# Patient Record
(demographics unavailable — no encounter records)

---

## 2024-11-04 NOTE — HISTORY OF PRESENT ILLNESS
[FreeTextEntry1] : The patient complains of intermittent left-sided chest pain. The pain is described as aching, squeezing, and is radiating to left shoulder. The pain is both exertional and non- exertional and is aggravated by Stress and is relieved by rest. The pain is associated with SOB, and heart palpitations. Denies Dizziness, Leg Edema, Orthopnea, PND, Syncope, PENA, Diaphoresis Chest pain/SOB/Palpitations - Echo ordered to assess LV function/ possible valvular heart disease

## 2024-11-04 NOTE — REASON FOR VISIT
[Symptom and Test Evaluation] : symptom and test evaluation [Other: ____] : [unfilled] [Hypertension] : hypertension

## 2024-11-04 NOTE — PHYSICAL EXAM
[Well Developed] : well developed [Well Nourished] : well nourished [No Acute Distress] : no acute distress [Normal Conjunctiva] : normal conjunctiva [Normal Venous Pressure] : normal venous pressure [Normal S1, S2] : normal S1, S2 [No Rub] : no rub [No Gallop] : no gallop [Murmur] : murmur [Clear Lung Fields] : clear lung fields [Good Air Entry] : good air entry [No Respiratory Distress] : no respiratory distress  [Soft] : abdomen soft [Non Tender] : non-tender [No Masses/organomegaly] : no masses/organomegaly [Normal Bowel Sounds] : normal bowel sounds [Normal Gait] : normal gait [No Edema] : no edema [No Cyanosis] : no cyanosis [No Clubbing] : no clubbing [No Varicosities] : no varicosities [No Rash] : no rash [No Skin Lesions] : no skin lesions [Moves all extremities] : moves all extremities [No Focal Deficits] : no focal deficits [Normal Speech] : normal speech [Alert and Oriented] : alert and oriented [Normal memory] : normal memory [de-identified] :  2/6 VANIA --> Axilla

## 2024-11-04 NOTE — END OF VISIT
[FreeTextEntry3] :  All medical records entered made by the scribe were at my Dr. Shin Webster, discretion and personally dictated by me on Nov 4 2024 10:20AM. I have reviewed the chart and agree that the record accuracy reflects my personal performance of the history, physical exam, assessment and plan. I have also personally directed, reviewed and agreed with the chart. [Time Spent: ___ minutes] : I have spent [unfilled] minutes of time on the encounter which excludes teaching and separately reported services.

## 2024-11-04 NOTE — DISCUSSION/SUMMARY
[Hypertension] : hypertension [Medication Changes Per Orders] : Medication changes are as documented in orders [Continue] : continuing angiotensin receptor blockers [Begin] : beginning beta blockers [Smoking Cessation] : smoking cessation [ETOH Moderation] : alcohol moderation [Low Sodium Diet] : low sodium diet [NSAIDs Avoidance] : non-steroidal anti-inflammatory drugs avoidance [Patient] : the patient [FreeTextEntry1] : Chest pain/SOB/Palp - echo with normal LV fxn; ETT ordered to r/o underlying ischemia. Blood work drawn. Maintain a low caloric, low sodium, low cholesterol diet. Dietary counseling given, diet and exercise discussed in detail.

## 2025-02-28 NOTE — HISTORY OF PRESENT ILLNESS
[de-identified] :  44yo female presents complaining of right shoulder pain for about 2 months.  She sustained a mechanical fall in her home injuring her shoulder.  She states she has a history of vertigo she fell backwards.  She since then she has had severe pain worsening.  She saw her PCP who did a cortisone injection about 1 month ago with no relief.  She also took an oral course of steroids which helps temporarily.  She is difficulty lifting her arm overhead.  Severe pain with behind her back.  Denies numbness tingling  The patient's past medical history, past surgical history, medications, allergies, and social history were reviewed by me today with the patient and documented accordingly. In addition, the patient's family history, which is noncontributory to this visit, was also reviewed.

## 2025-02-28 NOTE — PHYSICAL EXAM
[de-identified] : General Exam  Well developed, well nourished No apparent distress Oriented to person, place, and time Mood: Normal Affect: Normal Balance and coordination: Normal Gait: Normal  Right shoulder exam  Inspection: No swelling, ecchymosis or gross deformity. Skin: No masses, No lesions Tenderness: No bicipital tenderness, + tenderness to the greater tuberosity/RTC insertion, no anterior shoulder/lesser tuberosity tenderness. No tenderness SC joint, clavicle, AC joint. ROM: 90/30/L5 Impingement tests: Positive Vasquez AC Joint: no pain with cross arm testing Biceps: + speed Strength: 5-/5 abduction, external rotation, and 4/5 internal rotation positive belly press Neuro: AIN, PIN, Ulnar nerve motor intact Sensation: Intact to light touch in radial, median, ulnar, and axillary nerve distributions Vasc: 2+ radial pulse [de-identified] : The following radiographs were ordered and read by me during this patients visit. I reviewed each radiograph in detail with the patient and discussed the findings as highlighted below.  3 views right shoulder were obtained today glenohumeral joint well-maintained normal alignment no fracture.

## 2025-02-28 NOTE — DISCUSSION/SUMMARY
[de-identified] : 44 yo female with right shoulder pain for approximately 2 months. She is significant weakness with rotator cuff testing is a period of rest anti-inflammatory medications without relief. We will obtain an MRI to further evaluate she will follow-up after MRI. All questions were answered.